# Patient Record
Sex: MALE | Race: WHITE
[De-identification: names, ages, dates, MRNs, and addresses within clinical notes are randomized per-mention and may not be internally consistent; named-entity substitution may affect disease eponyms.]

---

## 2019-04-09 NOTE — PREOPHP
DATE OF ADMISSION: 04/10/2019

 

Scheduled for outpatient surgery 04/10/2019.

 

HISTORY OF PRESENT ILLNESS:  The patient is a 58-year-old male in overall good health with right groi
n pain for 2 months and a right inguinal hernia which is going to be repaired.  He has no GI or  sy
mptoms except for long-term chronic constipation.

 

PAST MEDICAL HISTORY:

MEDICATIONS:  Ibuprofen p.r.n.

 

ALLERGIES:  NONE.

 

OPERATIONS:  None.

 

PHYSICAL EXAMINATION:

VITAL SIGNS:  The patient is 5-foot, 7 inches, 159 pounds.  Vital signs are within normal limits.

HEENT:  Within normal limits.

LUNGS:  Clear.

HEART:  Regular rate, rhythm.

BREASTS:  Without masses.

ABDOMEN:  Thin and soft.  There is a reducible right inguinal hernia.  The left inguinal canal was in
tact.  RECTAL:  Negative per primary care.

EXTREMITIES:  Without edema.

GENITOURINARY:  Testes and scrotum are within normal limits.

NEUROLOGIC:  Physiologic.

 

IMPRESSION:  Right inguinal hernia, symptomatic.

 

PLAN:  Open repair of right inguinal hernia with mesh.  I have had a full discussion with the patient
 regarding the nature of his condition and the nature of the surgery, indications, alternatives, opti
ons and risks including bleeding, infection, recurrence despite use of mesh, neuritis or neuralgia, t
esticular or scrotal swelling or other abnormalities, et cetera.  All questions have been answered.  
He understands and agrees to proceed.

 

 

Dictated By: PARISH HORNE/CHEPE

DD:    04/09/2019 13:31:27

DT:    04/09/2019 13:43:29

Conf#: [id number]

DID#:  [id number]

CC: MELBA DILL MD;*EndCC*

## 2019-04-10 ENCOUNTER — HOSPITAL ENCOUNTER (OUTPATIENT)
Dept: HOSPITAL 10 - SDS | Age: 59
Discharge: HOME | End: 2019-04-10
Attending: SURGERY
Payer: COMMERCIAL

## 2019-04-10 ENCOUNTER — HOSPITAL ENCOUNTER (OUTPATIENT)
Dept: HOSPITAL 91 - SDS | Age: 59
Discharge: HOME | End: 2019-04-10
Payer: COMMERCIAL

## 2019-04-10 VITALS
WEIGHT: 152.56 LBS | HEIGHT: 66 IN | BODY MASS INDEX: 24.52 KG/M2 | WEIGHT: 152.56 LBS | HEIGHT: 66 IN | BODY MASS INDEX: 24.52 KG/M2

## 2019-04-10 VITALS — DIASTOLIC BLOOD PRESSURE: 69 MMHG | HEART RATE: 60 BPM | SYSTOLIC BLOOD PRESSURE: 113 MMHG | RESPIRATION RATE: 23 BRPM

## 2019-04-10 VITALS — DIASTOLIC BLOOD PRESSURE: 71 MMHG | SYSTOLIC BLOOD PRESSURE: 109 MMHG | RESPIRATION RATE: 18 BRPM | HEART RATE: 57 BPM

## 2019-04-10 VITALS — SYSTOLIC BLOOD PRESSURE: 108 MMHG | DIASTOLIC BLOOD PRESSURE: 70 MMHG | HEART RATE: 56 BPM | RESPIRATION RATE: 17 BRPM

## 2019-04-10 VITALS — HEART RATE: 56 BPM | RESPIRATION RATE: 17 BRPM | SYSTOLIC BLOOD PRESSURE: 111 MMHG | DIASTOLIC BLOOD PRESSURE: 68 MMHG

## 2019-04-10 VITALS — DIASTOLIC BLOOD PRESSURE: 63 MMHG | HEART RATE: 71 BPM | SYSTOLIC BLOOD PRESSURE: 103 MMHG | RESPIRATION RATE: 16 BRPM

## 2019-04-10 VITALS — HEART RATE: 54 BPM | SYSTOLIC BLOOD PRESSURE: 102 MMHG | RESPIRATION RATE: 14 BRPM | DIASTOLIC BLOOD PRESSURE: 72 MMHG

## 2019-04-10 VITALS — RESPIRATION RATE: 17 BRPM | DIASTOLIC BLOOD PRESSURE: 70 MMHG | HEART RATE: 54 BPM | SYSTOLIC BLOOD PRESSURE: 98 MMHG

## 2019-04-10 VITALS — DIASTOLIC BLOOD PRESSURE: 78 MMHG | SYSTOLIC BLOOD PRESSURE: 118 MMHG | RESPIRATION RATE: 11 BRPM | HEART RATE: 52 BPM

## 2019-04-10 DIAGNOSIS — K40.90: Primary | ICD-10-CM

## 2019-04-10 DIAGNOSIS — R00.1: ICD-10-CM

## 2019-04-10 DIAGNOSIS — F15.10: ICD-10-CM

## 2019-04-10 LAB
ADD MAN DIFF?: NO
ADD UMIC: YES
ALANINE AMINOTRANSFERASE: 47 IU/L (ref 13–69)
ALBUMIN/GLOBULIN RATIO: 1.46
ALBUMIN: 3.8 G/DL (ref 3.3–4.9)
ALKALINE PHOSPHATASE: 88 IU/L (ref 42–121)
ANION GAP: 4 (ref 5–13)
ASPARTATE AMINO TRANSFERASE: 35 IU/L (ref 15–46)
BASOPHIL #: 0.1 10^3/UL (ref 0–0.1)
BASOPHILS %: 0.8 % (ref 0–2)
BILIRUBIN,DIRECT: 0 MG/DL (ref 0–0.2)
BILIRUBIN,TOTAL: 0.7 MG/DL (ref 0.2–1.3)
BLOOD UREA NITROGEN: 22 MG/DL (ref 7–20)
CALCIUM: 9.2 MG/DL (ref 8.4–10.2)
CARBON DIOXIDE: 24 MMOL/L (ref 21–31)
CHLORIDE: 112 MMOL/L (ref 97–110)
CREATININE: 0.72 MG/DL (ref 0.61–1.24)
EOSINOPHILS #: 0.4 10^3/UL (ref 0–0.5)
EOSINOPHILS %: 4.6 % (ref 0–7)
GLOBULIN: 2.6 G/DL (ref 1.3–3.2)
GLUCOSE: 108 MG/DL (ref 70–220)
HEMATOCRIT: 39.5 % (ref 42–52)
HEMOGLOBIN: 13.9 G/DL (ref 14–18)
IMMATURE GRANS #M: 0.02 10^3/UL (ref 0–0.03)
IMMATURE GRANS % (M): 0.3 % (ref 0–0.43)
INR: 0.85
LYMPHOCYTES #: 1.5 10^3/UL (ref 0.8–2.9)
LYMPHOCYTES %: 18.8 % (ref 15–51)
MEAN CORPUSCULAR HEMOGLOBIN: 34.2 PG (ref 29–33)
MEAN CORPUSCULAR HGB CONC: 35.2 G/DL (ref 32–37)
MEAN CORPUSCULAR VOLUME: 97.1 FL (ref 82–101)
MEAN PLATELET VOLUME: 9.8 FL (ref 7.4–10.4)
MONOCYTE #: 0.7 10^3/UL (ref 0.3–0.9)
MONOCYTES %: 9.2 % (ref 0–11)
NEUTROPHIL #: 5.2 10^3/UL (ref 1.6–7.5)
NEUTROPHILS %: 66.3 % (ref 39–77)
NUCLEATED RED BLOOD CELLS #: 0 10^3/UL (ref 0–0)
NUCLEATED RED BLOOD CELLS%: 0 /100WBC (ref 0–0)
PARTIAL THROMBOPLASTIN TIME: 29.8 SEC (ref 23–35)
PLATELET COUNT: 200 10^3/UL (ref 140–415)
POTASSIUM: 3.9 MMOL/L (ref 3.5–5.1)
PROTIME: 11.7 SEC (ref 11.9–14.9)
PT RATIO: 0.9
RED BLOOD COUNT: 4.07 10^6/UL (ref 4.7–6.1)
RED CELL DISTRIBUTION WIDTH: 12.6 % (ref 11.5–14.5)
SODIUM: 140 MMOL/L (ref 135–144)
TOTAL PROTEIN: 6.4 G/DL (ref 6.1–8.1)
UR ASCORBIC ACID: NEGATIVE MG/DL
UR BILIRUBIN (DIP): NEGATIVE MG/DL
UR BLOOD (DIP): NEGATIVE MG/DL
UR CLARITY: (no result)
UR COLOR: YELLOW
UR GLUCOSE (DIP): NEGATIVE MG/DL
UR KETONES (DIP): NEGATIVE MG/DL
UR LEUKOCYTE ESTERASE (DIP): (no result) LEU/UL
UR MUCUS: (no result) /HPF
UR NITRITE (DIP): NEGATIVE MG/DL
UR PH (DIP): 5 (ref 5–9)
UR RBC: 1 /HPF (ref 0–5)
UR SPECIFIC GRAVITY (DIP): 1.03 (ref 1–1.03)
UR TOTAL PROTEIN (DIP): NEGATIVE MG/DL
UR UROBILINOGEN (DIP): NEGATIVE MG/DL
UR WBC: 28 /HPF (ref 0–5)
WHITE BLOOD COUNT: 7.8 10^3/UL (ref 4.8–10.8)

## 2019-04-10 PROCEDURE — 93005 ELECTROCARDIOGRAM TRACING: CPT

## 2019-04-10 PROCEDURE — 80053 COMPREHEN METABOLIC PANEL: CPT

## 2019-04-10 PROCEDURE — 85610 PROTHROMBIN TIME: CPT

## 2019-04-10 PROCEDURE — 88302 TISSUE EXAM BY PATHOLOGIST: CPT

## 2019-04-10 PROCEDURE — 85730 THROMBOPLASTIN TIME PARTIAL: CPT

## 2019-04-10 PROCEDURE — 49505 PRP I/HERN INIT REDUC >5 YR: CPT

## 2019-04-10 PROCEDURE — 81001 URINALYSIS AUTO W/SCOPE: CPT

## 2019-04-10 PROCEDURE — 71045 X-RAY EXAM CHEST 1 VIEW: CPT

## 2019-04-10 PROCEDURE — C1781 MESH (IMPLANTABLE): HCPCS

## 2019-04-10 PROCEDURE — 85025 COMPLETE CBC W/AUTO DIFF WBC: CPT

## 2019-04-10 RX ADMIN — BUPIVACAINE HYDROCHLORIDE 1 ML: 2.5 INJECTION, SOLUTION EPIDURAL; INFILTRATION; INTRACAUDAL; PERINEURAL at 12:14

## 2019-04-10 RX ADMIN — HYDROCODONE BITARTRATE AND ACETAMINOPHEN 1 TAB: 5; 325 TABLET ORAL at 14:25

## 2019-04-10 RX ADMIN — BACITRACIN 1 ML: 50000 INJECTION, POWDER, FOR SOLUTION INTRAMUSCULAR at 11:30

## 2019-04-10 RX ADMIN — FENTANYL CITRATE 1 MCG: 50 INJECTION, SOLUTION INTRAMUSCULAR; INTRAVENOUS at 12:48

## 2019-04-10 NOTE — RADRPT
Vent Rate: 57 bpm

RR Interval: 1048 msec

ND Interval: 162 msec

QRS Duration: 87 msec

QT Interval: 438 msec

QTC Interval: 428 msec

P-R-T Axis: 60 - 74 - 65 degrees

 

Sinus rhythm...normal P axis, V-rate  50- 99

Minimal ST elevation, inferior leads...ST >0.06mV, II III aVF

 

Electronically Signed By: Néstor Bashir

## 2019-04-10 NOTE — PAC
Date/Time of Note


Date/Time of Note


DATE: 4/10/19 


TIME: 13:50





Post-Anesthesia Notes


Post-Anesthesia Note


Last documented vital signs





Vital Signs


  Date      Temp  Pulse  Resp  B/P (MAP)   Pulse Ox  O2          O2 Flow    FiO2


Time                                                 Delivery    Rate


   4/10/19  98.0     60    23      113/69       100  Room Air


     13:07                           (84)


   4/10/19                                                             8.0


     12:56


   4/10/19  98.0


     12:42





Activity:  WNL


Respiratory function:  WNL


Cardiovascular function:  WNL


Mental status:  Baseline


Pain reasonably controlled:  Yes


Hydration appropriate:  Yes


Nausea/Vomiting absent:  No











SHIRA CURTIS MD            Apr 10, 2019 13:50

## 2019-04-10 NOTE — PREAC
Date/Time of Note


Date/Time of Note


DATE: 4/10/19 


TIME: 10:47





Anesthesia Eval and Record


Evaluation


Time Pre-Procedure Interview


DATE: 4/10/19 


TIME: 10:47


Age


58


Sex


male


NPO:  8 hrs


Preoperative diagnosis


right inguinal hernia


Planned procedure


R inguinal herbnia repair with mesh





Past Medical History


Past Medical History:  None





Surgery & Anesthesia Issues


No known issue





Meds


Anticoagulation:  No


Beta Blocker within 24 hr:  No


Reason Beta Blocker not given:  Pt. not on B-Blocker





Current Medications


Cefazolin Sodium 50 ml @  100 mls/hr PREOP IVPB ;  Start 4/10/19 at 06:30;  Stop


4/10/19 at 16:00


Sodium Chloride 1,000 ml @  75 mls/hr [id number] IV ;  Start 4/10/19 at 06:30;  


Stop 4/10/19 at 20:00


Meds reviewed:  Yes





Allergies


Coded Allergies:  


     No Known Allergy (Unverified , 4/10/19)


Allergies Reviewed:  Yes





Labs/Studies


Labs Reviewed:  Reviewed by anesthesiologist


Result Diagram:  


4/10/19 0940





Laboratory Tests


4/10/19 09:40








Pregnancy test:  N/A


Studies:  ECG (N?A), CXR (n/a)





Pre-procedure Exam


Last vitals





Vital Signs


  Date      Temp  Pulse  Resp  B/P (MAP)   Pulse Ox  O2          O2 Flow    FiO2


Time                                                 Delivery    Rate


   4/10/19  97.2     71    16      103/63        99


     09:30                           (76)





Airway:  Adequate mouth opening


Mallampati:  Mallampati I


Teeth:  Normal


Lung:  Normal


Heart:  Normal





ASA Physical Status


ASA physical status:  1


Emergency:  None





Planned Anesthetic


General/MAC:  ETT


Nerve block:  TAP (right)





Planned Pain Management


Single shot nerve block, Parenteral pain med





Pre-operative Attestations


Prior to commencing anesthesia and surgery, the patient was re-evaluated, there 


was verification of:


*The patient's identity


*The results of appropriate recent lab work and preoperative vital signs


*The above evaluation not changing prior to induction


*Anesthetic plan, risk benefits, alternative and complications discussed with 


patient/family; questions answered; patient/family understands, accepts and 


wishes to proceed.











SHIRA CURTIS MD            Apr 10, 2019 10:48

## 2019-04-10 NOTE — OPR
DATE OF OPERATION:  04/10/2019

 

 

SURGEON:  Parish Barrios MD

 

ASSISTANT:  None.

 

ANESTHESIOLOGIST:  Dr. Enriquez

 

TYPE OF ANESTHESIA:  General.

 

PREOPERATIVE DIAGNOSIS:  Right inguinal hernia.

 

POSTOPERATIVE DIAGNOSIS:  Right indirect inguinal hernia.

 

OPERATION PERFORMED:  Repair of right inguinal hernia with Prolene mesh.

 

DESCRIPTION OF PROCEDURE:  The patient was taken to the operating room and under

general anesthesia with sequential compression device stockings in placed, he 

was prepped and draped in the usual fashion.  A transverse curvilinear right 

groin incision was made, achieving hemostasis with cautery and 3-0 Vicryl ties 

as appropriate.  The external oblique aponeurosis was opened through the 

external ring, preserving the ilioinguinal nerve.  There was significant 

scarring throughout the groin from this longstanding hernia.  The spermatic cord

was encircled at the pubic tubercle and elevated to the internal ring.  It was 

quite bulky and the hernia was obviously indirect.  A  small lipoma of the cord 

was mobilized to the internal ring, ligated at its base with 3-0 Vicryl and 

excised.  The hernia sac was identified, anteromedial in the cord and was 

opened.  It was circumferentially dissected free of all dense adhesions to the 

cord well into the internal ring.  It was transfixed with a suture ligature of 

2-0 Vicryl and the excess sac excised and the stump retracted.  The repair was 

accomplished by taking a piece of Prolene mesh measuring approximately 5 x 9 cm 

cut and tapered appropriately and soaked in antibiotic solution.  First, the 

inferior edge of the mesh was sutured to the shelving edge of inguinal ligament 

starting at pubic tubercle and extending lateral to the internal ring with 

continuous 2-0 Prolene.  Then, the lateral aspect of the mesh was split 

longitudinally so the tails could encircle the cord.  Following this, the 

medial-superior edge of the mesh was sutured to the transversus tendon again 

starting at pubic tubercle and extending lateral to the internal ring with 

continuous 2-0 Prolene.  One suture was taken just lateral to the internal ring 

to tack the superior tail over the inferior tail to the inguinal ligament with 

2-0 Prolene.  The newly created internal ring of mesh did not constrict the 

cord.  The repair appeared very satisfactory without tension.  The field was 

irrigated with antibiotic solution and hemostasis was seen to be secure.  The 

external oblique aponeurosis was closed over the spermatic cord with continuous 

2-0 Vicryl.  Verona's fascia was closed with interrupted 3-0 Vicryl and skin 

closed with continuous 4-0 Monocryl subcuticular suture.  Mastisol and 1/2-inch 

Steri-Strips were applied, followed by dry sterile dressing.  Final sponge and 

needle counts were correct. At the end of the procedure the right testis was 

readily retracted well down into the scrotum.  The patient tolerated the 

procedure well and left the operating room in good condition.

 

 

Dictated By: PARISH HORNE/CHEPE

DD:    04/10/2019 12:31:34

DT:    04/10/2019 16:34:55

Conf#: [id number]

DID#:  [id number]

 

MIK

## 2019-04-10 NOTE — SIPON
Date/Time of Note


Date/Time of Note


DATE: 4/10/19 


TIME: 12:25





Operative Report


Preoperative Diagnosis


right inguinal hernia


Postoperative Diagnosis


indirect right inguinal hernia


Operation/Procedure Performed


repair right inguinal hernia with prolene mesh


Surgeon


see signature line


First assist


none


Anesthesia:  general


Estimated blood loss:  none


Transfusion Required


   none


Specimen


hernia sac


Grafts/Implants


prolene mesh


Complications


none











PARISH JOHNSON                 Apr 10, 2019 12:28

## 2019-04-10 NOTE — HPN
Date/Time of Note


Date/Time of Note


DATE: 4/10/19 


TIME: 10:43





Interval H&P Admission Note


Pt. seen H&P reviewed:  No system changes











PARISH JOHNSON                 Apr 10, 2019 10:43